# Patient Record
Sex: FEMALE | Race: WHITE | NOT HISPANIC OR LATINO | URBAN - METROPOLITAN AREA
[De-identification: names, ages, dates, MRNs, and addresses within clinical notes are randomized per-mention and may not be internally consistent; named-entity substitution may affect disease eponyms.]

---

## 2017-07-21 ENCOUNTER — ALLSCRIPTS OFFICE VISIT (OUTPATIENT)
Dept: OTHER | Facility: OTHER | Age: 67
End: 2017-07-21

## 2017-07-21 DIAGNOSIS — M25.472 EFFUSION OF LEFT ANKLE: ICD-10-CM

## 2017-07-21 DIAGNOSIS — I71.3 ABDOMINAL AORTIC ANEURYSM, RUPTURED (HCC): ICD-10-CM

## 2018-01-14 VITALS
HEIGHT: 66 IN | WEIGHT: 145.25 LBS | OXYGEN SATURATION: 98 % | RESPIRATION RATE: 16 BRPM | BODY MASS INDEX: 23.34 KG/M2 | DIASTOLIC BLOOD PRESSURE: 82 MMHG | SYSTOLIC BLOOD PRESSURE: 120 MMHG | HEART RATE: 79 BPM | TEMPERATURE: 98.5 F

## 2018-03-01 ENCOUNTER — ANESTHESIA EVENT (OUTPATIENT)
Dept: GASTROENTEROLOGY | Facility: HOSPITAL | Age: 68
End: 2018-03-01
Payer: COMMERCIAL

## 2018-03-02 ENCOUNTER — HOSPITAL ENCOUNTER (OUTPATIENT)
Facility: HOSPITAL | Age: 68
Setting detail: OUTPATIENT SURGERY
Discharge: HOME | End: 2018-03-02
Attending: COLON & RECTAL SURGERY | Admitting: COLON & RECTAL SURGERY
Payer: COMMERCIAL

## 2018-03-02 ENCOUNTER — ANESTHESIA (OUTPATIENT)
Dept: GASTROENTEROLOGY | Facility: HOSPITAL | Age: 68
End: 2018-03-02
Payer: COMMERCIAL

## 2018-03-02 VITALS
HEIGHT: 66 IN | TEMPERATURE: 97.9 F | RESPIRATION RATE: 18 BRPM | OXYGEN SATURATION: 100 % | SYSTOLIC BLOOD PRESSURE: 100 MMHG | HEART RATE: 69 BPM | BODY MASS INDEX: 21.05 KG/M2 | WEIGHT: 131 LBS | DIASTOLIC BLOOD PRESSURE: 58 MMHG

## 2018-03-02 DIAGNOSIS — Z86.010 HISTORY OF COLONIC POLYPS: ICD-10-CM

## 2018-03-02 PROCEDURE — 88305 TISSUE EXAM BY PATHOLOGIST: CPT | Performed by: PATHOLOGY

## 2018-03-02 PROCEDURE — 88305 TISSUE EXAM BY PATHOLOGIST: CPT | Performed by: COLON & RECTAL SURGERY

## 2018-03-02 RX ORDER — PROPOFOL 10 MG/ML
INJECTION, EMULSION INTRAVENOUS CONTINUOUS PRN
Status: DISCONTINUED | OUTPATIENT
Start: 2018-03-02 | End: 2018-03-02 | Stop reason: SURG

## 2018-03-02 RX ORDER — PROPOFOL 10 MG/ML
INJECTION, EMULSION INTRAVENOUS AS NEEDED
Status: DISCONTINUED | OUTPATIENT
Start: 2018-03-02 | End: 2018-03-02 | Stop reason: SURG

## 2018-03-02 RX ORDER — SODIUM CHLORIDE 9 MG/ML
125 INJECTION, SOLUTION INTRAVENOUS CONTINUOUS
Status: DISCONTINUED | OUTPATIENT
Start: 2018-03-02 | End: 2018-03-02 | Stop reason: HOSPADM

## 2018-03-02 RX ADMIN — SODIUM CHLORIDE 125 ML/HR: 0.9 INJECTION, SOLUTION INTRAVENOUS at 08:08

## 2018-03-02 RX ADMIN — PROPOFOL 120 MCG/KG/MIN: 10 INJECTION, EMULSION INTRAVENOUS at 08:47

## 2018-03-02 RX ADMIN — PROPOFOL 90 MG: 10 INJECTION, EMULSION INTRAVENOUS at 08:47

## 2018-03-02 NOTE — ANESTHESIA PREPROCEDURE EVALUATION
Review of Systems/Medical History  Patient summary reviewed  Chart reviewed  No history of anesthetic complications     Cardiovascular  Negative cardio ROS Exercise tolerance: good,     Pulmonary  Negative pulmonary ROS        GI/Hepatic  Negative GI/hepatic ROS   Bowel prep       Negative  ROS        Endo/Other  Negative endo/other ROS      GYN  Negative gynecology ROS          Hematology  Negative hematology ROS      Musculoskeletal  Negative musculoskeletal ROS        Neurology    Headaches,    Psychology   Negative psychology ROS              Physical Exam    Airway    Mallampati score: II  TM Distance: >3 FB  Neck ROM: full     Dental   No notable dental hx     Cardiovascular  Comment: Negative ROS, Cardiovascular exam normal    Pulmonary  Pulmonary exam normal     Other Findings        Anesthesia Plan  ASA Score- 2     Anesthesia Type- IV sedation with anesthesia with ASA Monitors  Additional Monitors:   Airway Plan:     Comment: Discussed with pt the possibility under sedation to have recall or mild discomfort        Plan Factors-    Induction- intravenous  Postoperative Plan-     Informed Consent- Anesthetic plan and risks discussed with patient  I personally reviewed this patient with the CRNA  Discussed and agreed on the Anesthesia Plan with the CRNA Gerhardt Sep

## 2018-03-02 NOTE — OP NOTE
**** GI/ENDOSCOPY REPORT ****     PATIENT NAME: Jamil Gage ------ VISIT ID:  Patient ID:   [id number] YOB: 1950     INTRODUCTION: Colonoscopy - A 74 female patient presents for an outpatient   Colonoscopy at [address] [place] [address]  PREVIOUS COLONOSCOPY: 2015     INDICATIONS: Screening for personal history of polyps  Screening for   family history of colorectal cancer, including the patient's grandmother  CONSENT:  The benefits, risks, and alternatives to the procedure were   discussed and informed consent was obtained from the patient  PREPARATION: EKG, pulse, pulse oximetry and blood pressure were monitored   throughout the procedure  The patient was identified by myself both   verbally and by visual inspection of ID band  MEDICATIONS: Anesthesia-check records     PROCEDURE:  The endoscope was passed without difficulty through the anus   under direct visualization and advanced to the cecum, confirmed by   appendiceal orifice and ileocecal valve  The scope was withdrawn and the   mucosa was carefully examined  The quality of the preparation was fairly   flushed to adequate  Cecal Intubation Time: 5 minutes(s) Scope Withdrawal   Time: 18 minutes(s)     RECTAL EXAM: Normal rectal exam      FINDINGS:  Splenic flexure polyp 5mm sessile, removed piecemeal cold   biopsy  COMPLICATIONS: There were no complications  IMPRESSIONS: Splenic flexure polyp 5mm sessile, removed piecemeal cold   biopsy  RECOMMENDATIONS: Colonoscopy recommended in 3 years  Start high fiber diet       ESTIMATED BLOOD LOSS:     PATHOLOGY SPECIMENS:     PROCEDURE CODES: Colonoscopy with biopsy     ICD-9 Codes: V12 74 Personal history of colonic polyps V16 0 Family   history of malignant neoplasm of gastrointestinal tract     ICD-10 Codes: Z86 010 Personal history of colonic polyps Z80 0 Family   history of malignant neoplasm of digestive organs     PERFORMED BY: Dr Jennifer Sultana [facility], M D  on 03/02/2018  Version 1, electronically signed by ARTURO Garcia  on   03/02/2018 at 09:15

## 2018-03-02 NOTE — ANESTHESIA POSTPROCEDURE EVALUATION
Post-Op Assessment Note      CV Status:  Stable    Mental Status:  Alert and lethargic    Hydration Status:  Euvolemic    PONV Controlled:  Controlled    Airway Patency:  Patent    Post Op Vitals Reviewed: Yes          Staff: CRNA           BP   100/74   Temp      Pulse   70   Resp   18   SpO2   100

## 2018-03-02 NOTE — H&P
History and Physical   Colon and Rectal Surgery   Asad Lei 74 y o  female MRN: [id number]  Unit/Bed#: [facility] Encounter: [id number]  03/02/18   8:40 AM      ASSESSMENT:Screening colonoscopy, risks including not limited to bleeding, missed lesion, perforation requiring emergent surgery discussed/understood  PLAN:Colonoscopy      History of Present Illness   HPI:  Asad Lei is a 74 y o  female who presents for colonoscopy, history of polyps, last 2015  Grandmother colon cancer  Denies nausea/vomiting/abdominal pain, rectal bleeding, or other constitutional symptoms, some alternating constipation/hard stools with watery stools        Historical Information   Past Medical History:   Diagnosis Date    Migraines      Past Surgical History:   Procedure Laterality Date    APPENDECTOMY      HYSTERECTOMY      TONSILLECTOMY         Meds/Allergies     Prescriptions Prior to Admission   Medication    Nutritional Supplements (VITAMIN D MAINTENANCE PO)         Current Facility-Administered Medications:     sodium chloride 0 9 % infusion, 125 mL/hr, Intravenous, Continuous, Jesus Grullon MD, Last Rate: 125 mL/hr at 03/02/18 0808, 125 mL/hr at 03/02/18 0808    Allergies   Allergen Reactions    Latex Rash         Social History   History   Alcohol Use    1 2 oz/week    2 Glasses of wine per week     Comment: saturday night      History   Drug Use No     History   Smoking Status    Never Smoker   Smokeless Tobacco    Never Used         Family History:   Grandmother colon cancer    Objective     Current Vitals:   Blood Pressure: 102/60 (03/02/18 0801)  Pulse: 63 (03/02/18 0801)  Temperature: 97 9 °F (36 6 °C) (03/02/18 0801)  Temp Source: Temporal (03/02/18 0801)  Respirations: 18 (03/02/18 0801)  Height: 5' 6" (167 6 cm) (03/02/18 0801)  Weight - Scale: 59 4 kg (131 lb) (03/02/18 0801)  SpO2: 99 % (03/02/18 0801)  No intake or output data in the 24 hours ending 03/02/18 0840    Physical Exam:  General:no distress  Eyes:perrla/eomi  ENT:moist mucus membranes  Neck:supple  Pulm:no increased work of breathing  CV:sinus  Abdomen:soft,nontender

## 2018-08-07 ENCOUNTER — ANESTHESIA EVENT (OUTPATIENT)
Dept: PERIOP | Facility: HOSPITAL | Age: 68
End: 2018-08-07
Payer: COMMERCIAL

## 2018-08-07 RX ORDER — MAG HYDROX/ALUMINUM HYD/SIMETH 400-400-40
1 SUSPENSION, ORAL (FINAL DOSE FORM) ORAL DAILY
COMMUNITY

## 2018-08-07 RX ORDER — MULTIVITAMIN
1 CAPSULE ORAL DAILY
COMMUNITY

## 2018-08-07 RX ORDER — FLUTICASONE PROPIONATE 50 MCG
1 SPRAY, SUSPENSION (ML) NASAL DAILY PRN
COMMUNITY

## 2018-08-07 NOTE — PRE-PROCEDURE INSTRUCTIONS
Pre-Surgery Instructions:   Medication Instructions    Cholecalciferol (VITAMIN D3) 5000 units CAPS Instructed patient per Anesthesia Guidelines   fluticasone (FLONASE) 50 mcg/act nasal spray Instructed patient per Anesthesia Guidelines   Multiple Vitamin (MULTIVITAMIN) capsule Instructed patient per Anesthesia Guidelines  Instructed to use flonase nasal spray am of surgery if needed per anesthesia

## 2018-08-08 ENCOUNTER — ANESTHESIA (OUTPATIENT)
Dept: PERIOP | Facility: HOSPITAL | Age: 68
End: 2018-08-08
Payer: COMMERCIAL

## 2018-08-08 ENCOUNTER — HOSPITAL ENCOUNTER (OUTPATIENT)
Facility: HOSPITAL | Age: 68
Setting detail: OUTPATIENT SURGERY
Discharge: HOME | End: 2018-08-09
Attending: OBSTETRICS & GYNECOLOGY | Admitting: OBSTETRICS & GYNECOLOGY
Payer: COMMERCIAL

## 2018-08-08 DIAGNOSIS — N81.10 FEMALE CYSTOCELE: Primary | ICD-10-CM

## 2018-08-08 PROCEDURE — C1771 REP DEV, URINARY, W/SLING: HCPCS | Performed by: OBSTETRICS & GYNECOLOGY

## 2018-08-08 PROCEDURE — C1781 MESH (IMPLANTABLE): HCPCS | Performed by: OBSTETRICS & GYNECOLOGY

## 2018-08-08 DEVICE — VAGINAL SUPPORT SYSTEM
Type: IMPLANTABLE DEVICE | Site: VAGINA | Status: FUNCTIONAL
Brand: UPHOLD™ LITE

## 2018-08-08 DEVICE — SLING TRNVG MID URETHRAL ADVANTAGE BLUE: Type: IMPLANTABLE DEVICE | Site: VAGINA | Status: FUNCTIONAL

## 2018-08-08 RX ORDER — SODIUM CHLORIDE 9 MG/ML
125 INJECTION, SOLUTION INTRAVENOUS CONTINUOUS
Status: DISCONTINUED | OUTPATIENT
Start: 2018-08-08 | End: 2018-08-09 | Stop reason: HOSPADM

## 2018-08-08 RX ORDER — ONDANSETRON 2 MG/ML
4 INJECTION INTRAMUSCULAR; INTRAVENOUS ONCE AS NEEDED
Status: DISCONTINUED | OUTPATIENT
Start: 2018-08-08 | End: 2018-08-08 | Stop reason: HOSPADM

## 2018-08-08 RX ORDER — PROPOFOL 10 MG/ML
INJECTION, EMULSION INTRAVENOUS CONTINUOUS PRN
Status: DISCONTINUED | OUTPATIENT
Start: 2018-08-08 | End: 2018-08-08 | Stop reason: SURG

## 2018-08-08 RX ORDER — LIDOCAINE HYDROCHLORIDE AND EPINEPHRINE 15; 5 MG/ML; UG/ML
INJECTION, SOLUTION EPIDURAL AS NEEDED
Status: DISCONTINUED | OUTPATIENT
Start: 2018-08-08 | End: 2018-08-08 | Stop reason: SURG

## 2018-08-08 RX ORDER — IBUPROFEN 600 MG/1
600 TABLET ORAL EVERY 6 HOURS
Status: DISCONTINUED | OUTPATIENT
Start: 2018-08-08 | End: 2018-08-09 | Stop reason: HOSPADM

## 2018-08-08 RX ORDER — LIDOCAINE HYDROCHLORIDE AND EPINEPHRINE 20; 5 MG/ML; UG/ML
INJECTION, SOLUTION EPIDURAL; INFILTRATION; INTRACAUDAL; PERINEURAL AS NEEDED
Status: DISCONTINUED | OUTPATIENT
Start: 2018-08-08 | End: 2018-08-08 | Stop reason: SURG

## 2018-08-08 RX ORDER — EPHEDRINE SULFATE 50 MG/ML
INJECTION, SOLUTION INTRAVENOUS AS NEEDED
Status: DISCONTINUED | OUTPATIENT
Start: 2018-08-08 | End: 2018-08-08 | Stop reason: SURG

## 2018-08-08 RX ORDER — FENTANYL CITRATE 50 UG/ML
INJECTION, SOLUTION INTRAMUSCULAR; INTRAVENOUS AS NEEDED
Status: DISCONTINUED | OUTPATIENT
Start: 2018-08-08 | End: 2018-08-08 | Stop reason: SURG

## 2018-08-08 RX ORDER — SODIUM CHLORIDE 9 MG/ML
75 INJECTION, SOLUTION INTRAVENOUS CONTINUOUS
Status: DISCONTINUED | OUTPATIENT
Start: 2018-08-08 | End: 2018-08-09 | Stop reason: HOSPADM

## 2018-08-08 RX ORDER — ONDANSETRON 2 MG/ML
4 INJECTION INTRAMUSCULAR; INTRAVENOUS EVERY 6 HOURS PRN
Status: DISCONTINUED | OUTPATIENT
Start: 2018-08-08 | End: 2018-08-09 | Stop reason: HOSPADM

## 2018-08-08 RX ORDER — MIDAZOLAM HYDROCHLORIDE 1 MG/ML
INJECTION INTRAMUSCULAR; INTRAVENOUS AS NEEDED
Status: DISCONTINUED | OUTPATIENT
Start: 2018-08-08 | End: 2018-08-08 | Stop reason: SURG

## 2018-08-08 RX ORDER — FENTANYL CITRATE/PF 50 MCG/ML
25 SYRINGE (ML) INJECTION
Status: DISCONTINUED | OUTPATIENT
Start: 2018-08-08 | End: 2018-08-08 | Stop reason: HOSPADM

## 2018-08-08 RX ORDER — PHENAZOPYRIDINE HYDROCHLORIDE 200 MG/1
200 TABLET, FILM COATED ORAL
Status: DISCONTINUED | OUTPATIENT
Start: 2018-08-08 | End: 2018-08-08 | Stop reason: HOSPADM

## 2018-08-08 RX ORDER — ONDANSETRON 2 MG/ML
INJECTION INTRAMUSCULAR; INTRAVENOUS AS NEEDED
Status: DISCONTINUED | OUTPATIENT
Start: 2018-08-08 | End: 2018-08-08 | Stop reason: SURG

## 2018-08-08 RX ORDER — ACETAMINOPHEN 325 MG/1
650 TABLET ORAL EVERY 6 HOURS
Status: DISCONTINUED | OUTPATIENT
Start: 2018-08-08 | End: 2018-08-09 | Stop reason: HOSPADM

## 2018-08-08 RX ORDER — DOCUSATE SODIUM 100 MG/1
100 CAPSULE, LIQUID FILLED ORAL 2 TIMES DAILY
Status: DISCONTINUED | OUTPATIENT
Start: 2018-08-08 | End: 2018-08-09 | Stop reason: HOSPADM

## 2018-08-08 RX ADMIN — ONDANSETRON HYDROCHLORIDE 4 MG: 2 INJECTION, SOLUTION INTRAVENOUS at 11:15

## 2018-08-08 RX ADMIN — SODIUM CHLORIDE 75 ML/HR: 0.9 INJECTION, SOLUTION INTRAVENOUS at 19:58

## 2018-08-08 RX ADMIN — DEXAMETHASONE SODIUM PHOSPHATE 8 MG: 10 INJECTION INTRAMUSCULAR; INTRAVENOUS at 09:25

## 2018-08-08 RX ADMIN — DOCUSATE SODIUM 100 MG: 100 CAPSULE, LIQUID FILLED ORAL at 18:20

## 2018-08-08 RX ADMIN — EPHEDRINE SULFATE 10 MG: 50 INJECTION, SOLUTION INTRAVENOUS at 10:03

## 2018-08-08 RX ADMIN — FENTANYL CITRATE 100 MCG: 50 INJECTION, SOLUTION INTRAMUSCULAR; INTRAVENOUS at 08:29

## 2018-08-08 RX ADMIN — SODIUM CHLORIDE: 0.9 INJECTION, SOLUTION INTRAVENOUS at 09:54

## 2018-08-08 RX ADMIN — ACETAMINOPHEN 650 MG: 325 TABLET, FILM COATED ORAL at 16:49

## 2018-08-08 RX ADMIN — IBUPROFEN 600 MG: 600 TABLET ORAL at 22:41

## 2018-08-08 RX ADMIN — LIDOCAINE HYDROCHLORIDE,EPINEPHRINE BITARTRATE 10 ML: 20; .005 INJECTION, SOLUTION EPIDURAL; INFILTRATION; INTRACAUDAL; PERINEURAL at 09:08

## 2018-08-08 RX ADMIN — SODIUM CHLORIDE 125 ML/HR: 0.9 INJECTION, SOLUTION INTRAVENOUS at 08:55

## 2018-08-08 RX ADMIN — IBUPROFEN 600 MG: 600 TABLET ORAL at 16:49

## 2018-08-08 RX ADMIN — PROPOFOL 60 MCG/KG/MIN: 10 INJECTION, EMULSION INTRAVENOUS at 09:20

## 2018-08-08 RX ADMIN — CEFAZOLIN SODIUM 1000 MG: 1 SOLUTION INTRAVENOUS at 18:21

## 2018-08-08 RX ADMIN — SODIUM CHLORIDE: 0.9 INJECTION, SOLUTION INTRAVENOUS at 11:23

## 2018-08-08 RX ADMIN — CEFAZOLIN SODIUM 2000 MG: 2 SOLUTION INTRAVENOUS at 09:10

## 2018-08-08 RX ADMIN — PHENAZOPYRIDINE HYDROCHLORIDE 200 MG: 200 TABLET ORAL at 07:01

## 2018-08-08 RX ADMIN — MIDAZOLAM 2 MG: 1 INJECTION INTRAMUSCULAR; INTRAVENOUS at 08:29

## 2018-08-08 RX ADMIN — ACETAMINOPHEN 650 MG: 325 TABLET, FILM COATED ORAL at 22:41

## 2018-08-08 RX ADMIN — MIDAZOLAM 2 MG: 1 INJECTION INTRAMUSCULAR; INTRAVENOUS at 09:10

## 2018-08-08 RX ADMIN — SODIUM CHLORIDE 125 ML/HR: 0.9 INJECTION, SOLUTION INTRAVENOUS at 07:11

## 2018-08-08 RX ADMIN — LIDOCAINE HYDROCHLORIDE AND EPINEPHRINE 3 ML: 15; 5 INJECTION, SOLUTION EPIDURAL at 09:05

## 2018-08-08 NOTE — ANESTHESIA PREPROCEDURE EVALUATION
Review of Systems/Medical History  Patient summary reviewed  Chart reviewed  History of anesthetic complications PONV    Cardiovascular  Negative cardio ROS    Pulmonary  Negative pulmonary ROS        GI/Hepatic  Negative GI/hepatic ROS          Negative  ROS        Endo/Other  Negative endo/other ROS      GYN  Negative gynecology ROS          Hematology  Negative hematology ROS      Musculoskeletal  Back pain (HLD) , lumbar pain,   Arthritis     Neurology    Headaches, Visual impairment (B cataracts),    Psychology   Negative psychology ROS              Physical Exam    Airway    Mallampati score: II  TM Distance: >3 FB  Neck ROM: full     Dental   Comment: FEW MOLAR CROWNS,     Cardiovascular  Comment: Negative ROS, Rhythm: regular, Rate: normal, Cardiovascular exam normal    Pulmonary  Pulmonary exam normal Breath sounds clear to auscultation,     Other Findings        Anesthesia Plan  ASA Score- 2     Anesthesia Type- epidural with ASA Monitors  Additional Monitors:   Airway Plan:         Plan Factors-Patient not instructed to abstain from smoking on day of procedure  Patient did not smoke on day of surgery  Induction- intravenous  Postoperative Plan-     Informed Consent- Anesthetic plan and risks discussed with patient and son

## 2018-08-08 NOTE — DISCHARGE INSTRUCTIONS
Anterior Vaginal Repair   WHAT YOU NEED TO KNOW:   An anterior vaginal repair is a procedure to lift or tighten the front vaginal wall  This can help prevent you from leaking urine  The procedure is also called an anterior colporrhaphy  DISCHARGE INSTRUCTIONS:   Medicines:   · Pain medicine: You may need medicine to take away or decrease pain  ¨ Learn how to take your medicine  Ask what medicine and how much you should take  Be sure you know how, when, and how often to take it  ¨ Do not wait until the pain is severe before you take your medicine  Tell caregivers if your pain does not decrease  ¨ Pain medicine can make you dizzy or sleepy  Prevent falls by calling someone when you get out of bed or if you need help  · Antibiotics: This medicine is given to fight or prevent an infection caused by bacteria  Always take your antibiotics exactly as ordered by your healthcare provider  Do not stop taking your medicine unless directed by your healthcare provider  Never save antibiotics or take leftover antibiotics that were given to you for another illness  · Take your medicine as directed  Contact your healthcare provider if you think your medicine is not helping or if you have side effects  Tell him or her if you are allergic to any medicine  Keep a list of the medicines, vitamins, and herbs you take  Include the amounts, and when and why you take them  Bring the list or the pill bottles to follow-up visits  Carry your medicine list with you in case of an emergency  Follow up with your healthcare provider as directed:  Write down your questions so you remember to ask them during your visits  Self-care:   · Sex:  Do not have sex until your healthcare provider says it is okay  · Kegel exercises: To do kegel exercises, squeeze your pelvic floor muscles for 5 to 10 seconds, then release  Regular kegel exercises will help your pelvic floor muscles become stronger   This will help prevent you from leaking urine  Ask your healthcare provider when to start these exercises and how often to do them  · Sanitary pad:  Change your sanitary pad regularly  Keep track of how often you change the pad  · Love catheter:  Keep the bag below your waist  This will help prevent infection and other problems caused by urine flowing back into your bladder  Do not pull on the catheter because this can cause pain and bleeding, and the catheter could come out  Keep the catheter tubing free of kinks so your urine will flow into the bag  Your healthcare provider will remove the catheter as soon as possible, to help prevent infection  · Wound care:  When you are allowed to bathe or shower, carefully wash your vaginal area with soap and water  · Do not put pressure on your abdomen: This will help prevent damage to your surgery area  Do not strain, lift heavy objects, or stand for a very long time  Do not perform strenuous exercises, such as running and weight lifting  · Activity:  You may need to start walking within a few days after your procedure  Ask your healthcare provider when to start and how long you should walk  Ask about any other exercises that may be right for you  · Support socks: You may need to wear support socks  These are tight socks that help increase the circulation in your legs until you are more active  This helps prevent blood clots  Contact your healthcare provider if:   · You soak a sanitary pad with blood every hour for 4 hours  · You have vaginal pain that does not go away even after you take pain medicine  · You have pus or a foul-smelling discharge from your genital area  · You see blood in your urine  · You have pain during sex  · You have a fever, chills, a cough, or feel weak and achy  · You have nausea and vomiting  · You have questions or concerns about your condition or care    Seek care immediately or call 911 if:   · You feel something is bulging out into your vagina or rectum and not going back in     · You cannot urinate  · Your arm or leg feels warm, tender, and painful  It may look swollen and red  · You suddenly feel lightheaded and short of breath  · You have chest pain  You may have more pain when you take a deep breath or cough  You may cough up blood  © 2017 [facility] Information is for End User's use only and may not be sold, redistributed or otherwise used for commercial purposes  All illustrations and images included in CareNotes® are the copyrighted property of A [employer] M [employer] Inc  or [employer]  The above information is an  only  It is not intended as medical advice for individual conditions or treatments  Talk to your doctor, nurse or pharmacist before following any medical regimen to see if it is safe and effective for you  Bladder Sling Procedure   WHAT YOU NEED TO KNOW:   A bladder sling procedure is surgery to treat urinary incontinence in women  The sling acts as a hammock to keep your urethra in place and hold it closed when your bladder is full  You may have vaginal bleeding or discharge for up to a week after your surgery  Use sanitary pads  Do not use tampons  You may have some pelvic discomfort or trouble urinating  DISCHARGE INSTRUCTIONS:   Call 911 for any of the following:   · You have sudden trouble breathing  Seek care immediately if:   · Your bleeding gets worse  · You have yellow or foul smelling discharge from your vagina  · You cannot urinate, or you are urinating less than what is normal for you  · You feel confused  Contact your healthcare provider if:   · You have a fever  · You do not feel like you are able to empty your bladder completely when you urinate  · You feel the need to urinate very suddenly  · You have burning or stinging when you urinate  · You have blood in your urine  · Your skin is itchy, swollen, or you have a rash      · You have questions or concerns about your condition or care  Medicines:   · Prescription pain medicine  may be given  Ask your how to take this medicine safely  · Take your medicine as directed  Contact your healthcare provider if you think your medicine is not helping or if you have side effects  Tell him or her if you are allergic to any medicine  Keep a list of the medicines, vitamins, and herbs you take  Include the amounts, and when and why you take them  Bring the list or the pill bottles to follow-up visits  Carry your medicine list with you in case of an emergency  Self-catheterization:  You may need to put a catheter into your bladder after you urinate to empty any remaining urine  A catheter is a small rubber tube used to drain urine  Healthcare providers will teach you how to put the catheter in safely  This may be needed until you are completely emptying your bladder  Love catheter: You may have a Love catheter for a short period of time  The Love is a tube put into your bladder to drain urine into a bag  Keep the bag below your waist  This will prevent urine from flowing back into your bladder and causing an infection or other problems  Also, keep the tube free of kinks so the urine will drain properly  Do not pull on the catheter  This can cause pain and bleeding, and may cause the catheter to come out  Activity:  Do not lift heavy objects for 6 weeks after your procedure  Do not have intercourse for 4 to 6 weeks  Do not use a tampon for 4 weeks  Ask your healthcare provider when you can return to work or your usual activities  Do pelvic muscle exercises: These are also called Kegel exercises  These exercises help strengthen your pelvic muscles and help prevent urine leakage  Tighten the muscles of your pelvis and hold them tight for 5 seconds, then relax for 5 seconds  Gradually work up to tightening them for 10 seconds and relaxing for 10 seconds  Do this 3 times each day    Keep a record: Keep a record of when you urinate and if you leak any urine  Write down what you were doing when you leaked urine, such as coughing or sneezing  Bring the log to your follow-up visits  Prevent constipation:  Drink liquids as directed  You may need to drink more water than usual to soften your bowel movements  Eat a variety of healthy foods, especially fruit and foods high in fiber  You may need to use an over-the-counter bowel movement softener  Follow up with your healthcare provider as directed: You may need a test to check how much urine remains in your bladder after you urinate  This will help show how the sling is working  Write down your questions so you remember to ask them during your visits  © 2017 [facility] Information is for End User's use only and may not be sold, redistributed or otherwise used for commercial purposes  All illustrations and images included in CareNotes® are the copyrighted property of A [employer] A M , Inc  or [employer]  The above information is an  only  It is not intended as medical advice for individual conditions or treatments  Talk to your doctor, nurse or pharmacist before following any medical regimen to see if it is safe and effective for you

## 2018-08-08 NOTE — ANESTHESIA PROCEDURE NOTES
Epidural Block    Patient location during procedure: holding area  Start time: 8/8/2018 8:29 AM  Reason for block: at surgeon's request and primary anesthetic  Staffing  Anesthesiologist: Cody Layton Checklist  Completed: patient identified, site marked, surgical consent, pre-op evaluation, timeout performed, IV checked, risks and benefits discussed and monitors and equipment checked  Epidural  Patient position: sitting  Prep: Betadine  Patient monitoring: heart rate, continuous pulse ox and frequent blood pressure checks  Approach: midline  Location: lumbar (1-5)  Needle  Needle type: Tuohy   Needle gauge: 18 G  Catheter type: side hole  Catheter size: 20 G  Catheter at skin depth: 4 cm  Test dose: negative and lidocaine 1 5% with epinephrine 1-to-200,000negative aspiration for CSF, negative aspiration for heme and no paresthesia on injection  patient tolerated the procedure well with no immediate complications  Additional Notes  EPIDURAL CATHETER REMOVED WITH TIP INTTACT

## 2018-08-08 NOTE — PROGRESS NOTES
Progress Note - OB/GYN  Post-Op Check  Dorise Grief 74 y o  female MRN: [id number]  Unit/Bed#: E2 -01 Encounter: [id number]      A/P:  Post-Op day # 0 status post VEC, anterior colporrhaphy, pubovaginal sling insertion, cystoscopy  1) Routine Post-op Care: continue  2) Diet: Advance as tolerated  3) Boggs: Will d/c boggs tomorrow morning  4) UOP: 900 cc charted over 8 hrs; color: yellow   5) Labs: f/u CBC in am  6) DVT ppx: Cont   SCDs  7) Pain control: PO pain medications  8) Monitor vital signs    SUBJECTIVE:  Pain: minimal  Tolerating Oral Intake: is tolerating PO liquids and solids  Voiding: boggs inserted  Flatus: yes  Bowel Movement: no  Ambulating: No (boggs still inserted)  Chest Pain: no  Shortness of Breath: no  Leg Pain/Discomfort: no    OBJECTIVE:     Vitals:   Vitals:    08/08/18 1317 08/08/18 1323 08/08/18 1400 08/08/18 1510   BP:  137/86 126/74 132/85   BP Location:  Right arm Right arm Right arm   Pulse:  62 66 75   Resp:  16 18 18   Temp:  98 °F (36 7 °C) 98 °F (36 7 °C) (!) 96 7 °F (35 9 °C)   TempSrc:    Tympanic   SpO2: 96% 94% 96% 96%   Weight:       Height:           I/O       08/06 0701 - 08/07 0700 08/07 0701 - 08/08 0700 08/08 0701 - 08/09 0700    I V  (mL/kg)   3000 (49 8)    Total Intake(mL/kg)   3000 (49 8)    Urine (mL/kg/hr)   1770 (2 7)    Total Output     1770    Net     +1230                                   Invalid input(s): CA        Invalid input(s): DBILI, ALP    Physical exam:  General: sitting comfortably, AAOx3  Heart: RRR  Lungs: CTAB  Abdomen: soft, nondistended, nontender  Extremities: Nontender, SCDs currently on     MEDS:   Current Facility-Administered Medications   Medication Dose Route Frequency    acetaminophen (TYLENOL) tablet 650 mg  650 mg Oral Q6H    ceFAZolin (ANCEF) IVPB (premix) 1,000 mg  1,000 mg Intravenous Q8H    docusate sodium (COLACE) capsule 100 mg  100 mg Oral BID    ibuprofen (MOTRIN) tablet 600 mg  600 mg Oral Q6H    ondansetron [facility]) injection 4 mg  4 mg Intravenous Q6H PRN    sodium chloride 0 9 % infusion  125 mL/hr Intravenous Continuous    sodium chloride 0 9 % infusion  75 mL/hr Intravenous Continuous     Invasive Devices     Peripheral Intravenous Line            Peripheral IV 08/08/18 Left Forearm less than 1 day          Drain            Urethral Catheter Non-latex 18 Fr  less than 1 day              Medication Administration - last 24 hours from 08/07/2018 1742 to 08/08/2018 1742       Date/Time Order Dose Route Action Action by     08/08/2018 1203 sodium chloride 0 9 % infusion 125 mL/hr Intravenous Continued from Bethany Flores 71, RN     08/08/2018 1123 sodium chloride 0 9 % infusion   Intravenous New [facility], CRNA     08/08/2018 0954 sodium chloride 0 9 % infusion   Intravenous New 130[facility], CRNA     08/08/2018 2780 sodium chloride 0 9 % infusion   Intravenous Anesthesia Volume Adjustment Andie Villeda, CRNA     08/08/2018 0855 sodium chloride 0 9 % infusion 125 mL/hr Intravenous New Bag Mace Meigs, RN     08/08/2018 3670 sodium chloride 0 9 % infusion 125 mL/hr Intravenous New Bag Les Salgado RN     08/08/2018 1019 bacitracin 50,000 Units, gentamicin (GARAMYCIN) 40 mg/mL 80 mg in sodium chloride 0 9 % 1,000 mL irrigation bag 1,000 mL Irrigation Given Pam Macdonald MD     08/08/2018 0701 phenazopyridine (PYRIDIUM) tablet 200 mg 200 mg Oral Given Les Salgado RN     08/08/2018 1649 ibuprofen (MOTRIN) tablet 600 mg 600 mg Oral Given Jeramie Arias RN     08/08/2018 1649 acetaminophen (TYLENOL) tablet 650 mg 650 mg Oral Given Jeramie Arias RN            Signature / Title:  Bogdan Hassan MD, MD, Resident - Ob/Gyn    Date: 8/8/2018  Time: 5:42 PM

## 2018-08-08 NOTE — PLAN OF CARE
Problem: Nutrition/Hydration-ADULT  Goal: Nutrient/Hydration intake appropriate for improving, restoring or maintaining nutritional needs  Monitor and assess patient's nutrition/hydration status for malnutrition (ex- brittle hair, bruises, dry skin, pale skin and conjunctiva, muscle wasting, smooth red tongue, and disorientation)  Collaborate with interdisciplinary team and initiate plan and interventions as ordered  Monitor patient's weight and dietary intake as ordered or per policy  Utilize nutrition screening tool and intervene per policy  Determine patient's food preferences and provide high-protein, high-caloric foods as appropriate       INTERVENTIONS:  - Monitor oral intake, urinary output, labs, and treatment plans  - Assess nutrition and hydration status and recommend course of action  - Evaluate amount of meals eaten  - Assist patient with eating if necessary   - Allow adequate time for meals  - Recommend/ encourage appropriate diets, oral nutritional supplements, and vitamin/mineral supplements  - Order, calculate, and assess calorie counts as needed    - Assess need for intravenous fluids  - Provide specific nutrition/hydration education as appropriate  - Include patient/family/caregiver in decisions related to nutrition  Outcome: Progressing

## 2018-08-08 NOTE — OP NOTE
OPERATIVE REPORT  PATIENT NAME: Diallo Paul    :  1950  MRN: [id number]  Pt Location: [facility] 08    SURGERY DATE: 2018    Surgeon(s) and Role:     * Rosa M Mac MD - Primary     * Charli Walker MD - Resident, Malcom Saravia MD - Fellow, Assist    Preop Diagnosis:  Incomplete uterovaginal prolapse [N81 2]  Cystocele, midline [N81 11]  Incompetence or weakening of pubocervical tissue [N81 82]  Stress incontinence [N39 3]  Hypermobility of urethra [N36 41]    Post-Op Diagnosis Codes:     * Incomplete uterovaginal prolapse [N81 2]     * Cystocele, midline [N81 11]     * Incompetence or weakening of pubocervical tissue [N81 82]     * Stress incontinence [N39 3]     * Hypermobility of urethra [N36 41]    Procedure(s) (LRB):  COLPOPEXY VAGINAL EXTRAPERITONEAL (VEC) (N/A)  ANTERIOR, COLPORRHAPHY (N/A)  INSERTION PUBOVAGINAL SLING (FEMALE) (N/A)  CYSTOSCOPY (N/A)    Specimen(s):  * No specimens in log *    Estimated Blood Loss:   Minimal    Drains:  Urethral Catheter Non-latex 18 Fr  (Active)   Number of days: 0       Anesthesia Type:   Epidural    Operative Indications:  Incomplete uterovaginal prolapse [N81 2]  Cystocele, midline [N81 11]  Incompetence or weakening of pubocervical tissue [N81 82]  Stress incontinence [N39 3]  Hypermobility of urethra [N36 41]    Operative Findings:  1  Stage 3 cystocele and apical prolapse  2  No significant posterior defect  3  No occult bladder or urethral injury on cystoscopy  Robust bilateral efflux from ureteral orifices at completion of the case  Complications:   None    Procedure and Technique:    Appropriate preoperative antibiotics chosen per ACOG guidelines were given  Bilateral SCDs were placed in the lower extremities for DVT prevention prior to the institution of anesthesia  No bladder, ureteral, viscus, or solid organ injury were noted at the end of the procedure      The patient was identified in the holding area by the operating room staff and attending physician  She was taken to the operating room where anesthesia was instituted without complications  She was placed in the dorsal lithotomy position with the legs in [address] [place] [address] with care taken to avoid excessive flexion or extension of her lower extremities  The patient was prepped and draped in the usual sterile fashion  A Love catheter was inserted  The patient was taken to the operating room where she was properly identified  She was given spinal anesthesia and placed in the dorsal lithotomy position in [place] stirrups with care taken to avoid excess flexion or extension at the hips and knees  She was prepped and draped in a normal sterile fashion  A Love catheter was inserted in the bladder  The vesicovaginal space was infiltrated with a dilute Marcaine solution with epinephrine  A full-thickness anterior vaginal wall incision was then made vertically in the midline extending the length of the cystocele  Sharp dissection was then carried out bilaterally to further open the vesicovaginal space  Blunt dissection was then used to break into the paravaginal and paravesical spaces where the ischial spines and sacrospinous ligaments could be identified and cleared off bluntly bilaterally  The prevesical tissue was plicated across the midline with approximately 3 interrupted sutures of 2-0 Vicryl  The Uphold system was then opened  The right arm of the system was articulated with the Capio Device  The head of the Capio was placed in the right paravaginal space and placed over the sacrospinous ligament 2 cm medial to the ischial spine where it was fired capturing the ligament  This procedure was then repeated on the patient's left side  Initial adjustments were made to the arms of the system   The proximal port portion of the body of the mesh in the midline was anchored to the anterior stroma of the cervix at the 12 o'clock position with an interrupted figure-of-eight suture of 0 Ethibond  Final adjustments were then made to the arms of the system, elevating the apex of the vagina and the cervix up to the level of the sacrospinous ligaments  The Prolene suture loop on the patient's right side was cut, and the suture loop and the plastic sheath covering the mesh arm were removed leaving just the right mesh arm in the right sacrospinous ligament  This procedure was then repeated on the patient's left side  The distal portion of the body and the mesh in the midline was anchored just behind the vaginal wall at the level of the bladder neck with an interrupted suture of 2-0 PDS  An additional tacking stitch of 2-0 Vicryl was placed on either side the midline so that the body of the mesh lay flat in the vesicovaginal space  This space was then irrigated with an antibiotic saline solution, and good hemostasis was noted  Excess vaginal wall was trimmed from each edge of the incision  The remaining pubocervical fascia was then plicated across the midline as the full-thickness anterior vaginal wall incision was closed with a running locked suture of 2-0 Vicryl for excellent hemostasis  Next, the retropubic sling procedure was performed  Two areas on the anterior abdominal wall at the level of pubic bone 2 fingerbreadths above and lateral to the midline on the pubic bone, were identified and two skin marks were placed  Local anesthetic solution was delivered at these sites infiltrating the muscle, fascia, and subdermal levels  We then turned our attention to the anterior vaginal wall  The mid urethral level was identified by reference to the Love bulb at the external meatus  Local anesthetic solution was injected into the anterior vaginal wall at the midurethral level for hydrodissection and vasoconstriction   Then, 10 mL were injected at the midline and an additional 7 mL were injected to the left and right of midline directing the infiltration laterally towards the ipsilateral mid-axillary line under the pubic symphysis  After completion of hydrodissection, 2 Allis clamps were used to grasp the anterior vaginal wall for traction, and a 1 3-cm incision was made at the midline incising epithelium and muscularis layers  Two Allis clamps were then placed on each cut edge of the incision for stabilization  Tenotomy scissors were used to create a small vaginal tunnel with sharp and blunt dissection in the anterior vaginal wall directing the dissection lateral to the midline, going towards the underside of the pubic bone  Dissection was carried out to the edge of the pubic bone itself, and we did this bilaterally  Once the dissection was complete, the Love was drained  The rigid stylette was placed through the Love  This will be used for deflection of the bladder, and urethra out of the anticipated path of the trocar  We deviated the bladder to the left side of the patient, and we passed an Advantage sling via the [address] introducer into the pre-dissected tract on the right side of the patient  Once we pierced the pubocervical fascia and passed the bone, we directed our trajectory of the trocar ventral towards the anterior abdominal wall towards the pre-marked skin on the right side the patient maintaining contact with the pubic symphysis  Once we were at the level of the skin, we incised the skin at the misty with a scalpel, and the trocar was passed through that incision  At this time, this was held with ring forceps leaving the trocar in place  We drained the bladder and removed the Love and we performed a cystoscopy by instilling 200 mL of fluid inside the bladder  There was no evidence of any punctures, lacerations, or injuries to the bladder  We reinserted the Love catheter to again drain the bladder   We removed the introducer on the right side and pulled the blue plastic cover of the sling with a Ally clamp until the mesh came out the abdominal incision and secured it at the level of the mesh with the [employer]  We cut the blue plastic cover above the Ally clamp  We repeated the same sequence of steps for the placement of the left side  Once this was done bilaterally, cystoscopy was performed again  Good efflux was noted bilaterally from the ureteral orifices  We retrograde filled the bladder with 300 mL and removed the cystoscope  We performed the cough stress or Crede maneuver and adjusted the sling until we saw minimal to no leakage of fluid  Once the sling was tensioned properly, we removed the plastic sleeve surrounding the sling arms by gently pulling them up through the skin incisions bilaterally with a Ally clamp as counter traction was placed at the midurethral level to avoid any further tightening  The sling arms were trimmed to the level of the skin  The incision in the vaginal mucosa was closed with 2-0 Vicryl suture in a running locked stitch  Good hemostasis was achieved  The skin incisions were closed with Histoacryl  The patient tolerated the procedure well  The Love catheter was reinserted  Vaginal packing was placed in the vagina  The sponge, needle and instrument count were correct x 2  The patient tolerated the procedure well  She was awakened from anesthesia and transferred to the recovery room in stable condition  A qualified resident physician was not available  Dr Tian Cornejo was present for the entire procedure  No qualified resident available to assist with procedure      Patient Disposition:  PACU     SIGNATURE: Jacobo Pradhan MD  DATE: August 8, 2018  TIME: 11:15 AM

## 2018-08-09 VITALS
OXYGEN SATURATION: 100 % | RESPIRATION RATE: 18 BRPM | BODY MASS INDEX: 20.88 KG/M2 | WEIGHT: 133 LBS | HEART RATE: 76 BPM | TEMPERATURE: 97.9 F | SYSTOLIC BLOOD PRESSURE: 97 MMHG | DIASTOLIC BLOOD PRESSURE: 70 MMHG | HEIGHT: 67 IN

## 2018-08-09 PROCEDURE — 90670 PCV13 VACCINE IM: CPT | Performed by: OBSTETRICS & GYNECOLOGY

## 2018-08-09 RX ORDER — ACETAMINOPHEN 500 MG
500 TABLET ORAL EVERY 6 HOURS PRN
Qty: 30 TABLET | Refills: 0
Start: 2018-08-09

## 2018-08-09 RX ORDER — IBUPROFEN 600 MG/1
600 TABLET ORAL EVERY 6 HOURS PRN
Qty: 30 TABLET | Refills: 0
Start: 2018-08-09

## 2018-08-09 RX ADMIN — CEFAZOLIN SODIUM 1000 MG: 1 SOLUTION INTRAVENOUS at 09:41

## 2018-08-09 RX ADMIN — PNEUMOCOCCAL 13-VALENT CONJUGATE VACCINE 0.5 ML: 2.2; 2.2; 2.2; 2.2; 2.2; 4.4; 2.2; 2.2; 2.2; 2.2; 2.2; 2.2; 2.2 INJECTION, SUSPENSION INTRAMUSCULAR at 12:34

## 2018-08-09 RX ADMIN — ACETAMINOPHEN 650 MG: 325 TABLET, FILM COATED ORAL at 12:36

## 2018-08-09 RX ADMIN — IBUPROFEN 600 MG: 600 TABLET ORAL at 06:00

## 2018-08-09 RX ADMIN — IBUPROFEN 600 MG: 600 TABLET ORAL at 12:37

## 2018-08-09 RX ADMIN — CEFAZOLIN SODIUM 1000 MG: 1 SOLUTION INTRAVENOUS at 02:19

## 2018-08-09 RX ADMIN — DOCUSATE SODIUM 100 MG: 100 CAPSULE, LIQUID FILLED ORAL at 09:41

## 2018-08-09 NOTE — PROGRESS NOTES
[facility] Goins Progress note   Samuel Ayala 74 y o  female MRN: [id number]  Unit/Bed#: E2 -01 Encounter: [id number]      A/P: 74 y o  female s/p anterior VEC, anterior colporrhaphy, pubovaginal sling & cystoscopy for uterovaginal prolapse, cystocele and stress urinary incontinence, POD# 1, doing well postoperatively  1) Uterovaginal prolapse, cystocele, stress urinary incontinence: s/p surgery as mentioned above  2) Post operative Care:                        - FEN: Regular diet                        - Pain: Scheduled Tylenol & Motrin                        - DVT ppx: SCDs                        - Encouraged incentive spirometry to reduce atelectasis and pneumonia risk                        - Encouraged ambulation as tolerated                        - Boggs catheter to be removed this morning, follow up voiding trial in 4 hours                        - Vaginal packing removed this morning  3) Disposition: Anticipate discharge today pending clinical status; outpatient follow-up with Dr Dwayne Ocasio in 2 weeks  Samuel Ayala has no current complaints  She reports that she rested well overnight  Pain is well controlled with PO medication  Boggs is in place draining urine, nurse in room to remove boggs  She is not yet ambulating  Patient is currently passing flatus and has had no bowel movement  She is tolerating PO, and denies nausea or vomitting  Patient denies fever, chills, chest pain, shortness of breath, or calf tenderness  /73   Pulse 67   Temp 97 6 °F (36 4 °C) (Temporal)   Resp 20   Ht 5' 7" (1 702 m)   Wt 60 3 kg (133 lb)   SpO2 95%   Breastfeeding? No   BMI 20 83 kg/m²     I/O last 3 completed shifts:   In: 3000 [I V :3000]  Out: 2620 [Urine:2620]  I/O this shift:  In: -   Out: 850 [Urine:850]    Lab Results   Component Value Date    WBC 3 49 (L) 12/02/2015    HGB 13 8 12/02/2015    HCT 42 4 12/02/2015    MCV 87 12/02/2015     12/02/2015       Lab Results   Component Value Date GLUCOSE 87 12/02/2015    CALCIUM 8 9 12/02/2015     12/02/2015    K 4 4 12/02/2015    CO2 27 12/02/2015     12/02/2015    BUN 16 12/02/2015    CREATININE 0 86 12/02/2015       No results found for: POCGLU    Physical Exam  Gen: NAD, AAOx3, Pleasant & Cooperative  CV: RRR, No M/R/G  Resp: CTAB, No W/R/R  Abdomen: Soft, non-distended, non-tender  Mild ecchymoses at the level of pubovaginal sling incisions, appropriately tender, no rebound/guarding  : Vaginal packing removed during examination without difficulty, external genitalia normal appearing  Ext: Symmetric, non-tender    Ysabel MD Lara  OBGYN PGY3  8/9/2018  5:59 AM

## 2018-09-26 NOTE — OP NOTE
Ken Ramirez MD Physician Signed Urogynecology Op Note Date of Service: 2018  9:27 AM   Case ID: [id number] Case Time: 2018  9:27 AM Surgeon: Domonique Gunter MD   Procedure: COLPOPEXY VAGINAL EXTRAPERITONEAL (VEC) Location: [facility]          []Hide copied text  []Hover for attribution information  OPERATIVE REPORT  PATIENT NAME: Bibiana Szymanski    :  1950  MRN: [id number]  Pt Location: [facility] 08     SURGERY DATE: 2018     Surgeon(s) and Role:     * Domonique Gunter MD - Primary     * Edgar Chandra MD - Resident, Renard Wray MD - Fellow, Assist     Preop Diagnosis:  Incomplete uterovaginal prolapse [N81 2]  Cystocele, midline [N81 11]  Incompetence or weakening of pubocervical tissue [N81 82]  Stress incontinence [N39 3]  Hypermobility of urethra [N36 41]     Post-Op Diagnosis Codes:     * Incomplete uterovaginal prolapse [N81 2]     * Cystocele, midline [N81 11]     * Incompetence or weakening of pubocervical tissue [N81 82]     * Stress incontinence [N39 3]     * Hypermobility of urethra [N36 41]     Procedure(s) (LRB):  COLPOPEXY VAGINAL EXTRAPERITONEAL (VEC) (N/A)  ANTERIOR, COLPORRHAPHY (N/A)  INSERTION PUBOVAGINAL SLING (FEMALE) (N/A)  CYSTOSCOPY (N/A)     Specimen(s):  * No specimens in log *     Estimated Blood Loss:   Minimal     Drains:  Urethral Catheter Non-latex 18 Fr  (Active)   Number of days: 0         Anesthesia Type:   Epidural     Operative Indications:  Incomplete uterovaginal prolapse [N81 2]  Cystocele, midline [N81 11]  Incompetence or weakening of pubocervical tissue [N81 82]  Stress incontinence [N39 3]  Hypermobility of urethra [N36 41]     Operative Findings:  1  Stage 3 cystocele and apical prolapse  2  No significant posterior defect  3  No occult bladder or urethral injury on cystoscopy   Robust bilateral efflux from ureteral orifices at completion of the case       Complications:   None     Procedure and Technique:     Appropriate preoperative antibiotics chosen per ACOG guidelines were given  Bilateral SCDs were placed in the lower extremities for DVT prevention prior to the institution of anesthesia      No bladder, ureteral, viscus, or solid organ injury were noted at the end of the procedure      The patient was identified in the holding area by the operating room staff and attending physician  She was taken to the operating room where anesthesia was instituted without complications  She was placed in the dorsal lithotomy position with the legs in [address] [place] [address] with care taken to avoid excessive flexion or extension of her lower extremities  The patient was prepped and draped in the usual sterile fashion  A Love catheter was inserted      The patient was taken to the operating room where she was properly identified  She was given spinal anesthesia and placed in the dorsal lithotomy [facility] [facility] with care taken to avoid excess flexion or extension at the hips and knees  She was prepped and draped in a normal sterile fashion  A Love catheter was inserted in the bladder  The vesicovaginal space was infiltrated with a dilute Marcaine solution with epinephrine  A full-thickness anterior vaginal wall incision was then made vertically in the midline extending the length of the cystocele  Sharp dissection was then carried out bilaterally to further open the vesicovaginal space  Blunt dissection was then used to break into the paravaginal and paravesical spaces where the ischial spines and sacrospinous ligaments could be identified and cleared off bluntly bilaterally  The prevesical tissue was plicated across the midline with approximately 3 interrupted sutures of 2-0 Vicryl  The Uphold system was then opened  The right arm of the system was articulated with the Capio Device   The head of the Capio was placed in the right paravaginal space and placed over the sacrospinous ligament 2 cm medial to the ischial spine where it was fired capturing the ligament  This procedure was then repeated on the patient's left side  Initial adjustments were made to the arms of the system  The proximal port portion of the body of the mesh in the midline was anchored to the anterior stroma of the cervix at the 12 o'clock position with an interrupted figure-of-eight suture of 0 Ethibond  Final adjustments were then made to the arms of the system, elevating the apex of the vagina and the cervix up to the level of the sacrospinous ligaments  The Prolene suture loop on the patient's right side was cut, and the suture loop and the plastic sheath covering the mesh arm were removed leaving just the right mesh arm in the right sacrospinous ligament  This procedure was then repeated on the patient's left side  The distal portion of the body and the mesh in the midline was anchored just behind the vaginal wall at the level of the bladder neck with an interrupted suture of 2-0 PDS  An additional tacking stitch of 2-0 Vicryl was placed on either side the midline so that the body of the mesh lay flat in the vesicovaginal space  This space was then irrigated with an antibiotic saline solution, and good hemostasis was noted  Excess vaginal wall was trimmed from each edge of the incision  The remaining pubocervical fascia was then plicated across the midline as the full-thickness anterior vaginal wall incision was closed with a running locked suture of 2-0 Vicryl for excellent hemostasis       Next, the retropubic sling procedure was performed  Two areas on the anterior abdominal wall at the level of pubic bone 2 fingerbreadths above and lateral to the midline on the pubic bone, were identified and two skin marks were placed  Local anesthetic solution was delivered at these sites infiltrating the muscle, fascia, and subdermal levels  We then turned our attention to the anterior vaginal wall   The mid urethral level was identified by reference to the Love bulb at the external meatus  Local anesthetic solution was injected into the anterior vaginal wall at the midurethral level for hydrodissection and vasoconstriction  Then, 10 mL were injected at the midline and an additional 7 mL were injected to the left and right of midline directing the infiltration laterally towards the ipsilateral mid-axillary line under the pubic symphysis       After completion of hydrodissection, 2 Allis clamps were used to grasp the anterior vaginal wall for traction, and a 1 3-cm incision was made at the midline incising epithelium and muscularis layers  Two Allis clamps were then placed on each cut edge of the incision for stabilization  Tenotomy scissors were used to create a small vaginal tunnel with sharp and blunt dissection in the anterior vaginal wall directing the dissection lateral to the midline, going towards the underside of the pubic bone  Dissection was carried out to the edge of the pubic bone itself, and we did this bilaterally  Once the dissection was complete, the Love was drained  The rigid stylette was placed through the Love  This will be used for deflection of the bladder, and urethra out of the anticipated path of the trocar  We deviated the bladder to the left side of the patient, and we passed an Advantage sling via the [address] introducer into the pre-dissected tract on the right side of the patient  Once we pierced the pubocervical fascia and passed the bone, we directed our trajectory of the trocar ventral towards the anterior abdominal wall towards the pre-marked skin on the right side the patient maintaining contact with the pubic symphysis  Once we were at the level of the skin, we incised the skin at the misty with a scalpel, and the trocar was passed through that incision  At this time, this was held with ring forceps leaving the trocar in place   We drained the bladder and removed the Love and we performed a cystoscopy by instilling 200 mL of fluid inside the bladder  There was no evidence of any punctures, lacerations, or injuries to the bladder  We reinserted the Love catheter to again drain the bladder  We removed the introducer on the right side and pulled the blue plastic cover of the sling with a Ally clamp until the mesh came out the abdominal incision and secured it at the level of the mesh with the [employer]  We cut the blue plastic cover above the Ally clamp  We repeated the same sequence of steps for the placement of the left side      Once this was done bilaterally, cystoscopy was performed again  Good efflux was noted bilaterally from the ureteral orifices  We retrograde filled the bladder with 300 mL and removed the cystoscope  We performed the cough stress or Crede maneuver and adjusted the sling until we saw minimal to no leakage of fluid  Once the sling was tensioned properly, we removed the plastic sleeve surrounding the sling arms by gently pulling them up through the skin incisions bilaterally with a Ally clamp as counter traction was placed at the midurethral level to avoid any further tightening  The sling arms were trimmed to the level of the skin  The incision in the vaginal mucosa was closed with 2-0 Vicryl suture in a running locked stitch  Good hemostasis was achieved  The skin incisions were closed with Histoacryl  The patient tolerated the procedure well      The Love catheter was reinserted  Vaginal packing was placed in the vagina       The sponge, needle and instrument count were correct x 2  The patient tolerated the procedure well  She was awakened from anesthesia and transferred to the recovery room in stable condition      A qualified resident physician was not available   Dr Heriberto Ibarra was present for the entire procedure      No qualified resident available to assist with procedure      Patient Disposition:  PACU      SIGNATURE: Raegan Amaya MD  DATE: August 8, 2018  TIME: 11:15 AM

## 2018-12-17 NOTE — OP NOTE
[address]   [phone]     Tax ID: [id number]     NPI: [id number]    Op Note        []Hide copied text  Paula Dacosta MD Physician Signed Urogynecology Op Note Date of Service: 2018  9:27 AM   Case ID: [id number] Case Time: 2018  9:27 AM Surgeon: Makayla Shelton MD   Procedure: COLPOPEXY VAGINAL EXTRAPERITONEAL (VEC) Location: [facility]            []Hide copied text  []Hover for attribution information  OPERATIVE REPORT  PATIENT Miriam Chandler  :  1950  ITR: [id number]  Pt Location: [facility] RO[facility] 08     SURGERY DATE: 2018     Surgeon(s) and Role:     * Makayla Shelton MD - Primary     * Ken Thornton MD - Resident, Observe     * Paula Dacosta MD - Fellow, Assist     Preop Diagnosis:  Incomplete uterovaginal prolapse [N81 2]  Cystocele, midline [N81 11]  Incompetence or weakening of pubocervical tissue [N81 82]  Stress incontinence [N39 3]  Hypermobility of urethra [N36 41]     Post-Op Diagnosis Codes:     * Incomplete uterovaginal prolapse [N81 2]     * Cystocele, midline [N81 11]     * Incompetence or weakening of pubocervical tissue [N81 82]     * Stress incontinence [N39 3]     * Hypermobility of urethra [N36 41]     Procedure(s) (LRB):  COLPOPEXY VAGINAL EXTRAPERITONEAL (VEC) (N/A)  ANTERIOR, COLPORRHAPHY (N/A)  INSERTION PUBOVAGINAL SLING (FEMALE) (N/A)  CYSTOSCOPY (N/A)     Specimen(s):  * No specimens in log *     Estimated Blood Loss:   Minimal     Drains:  Urethral Catheter Non-latex 18 Fr  (Active)   Number of days: 0         Anesthesia Type:   Epidural     Operative Indications:  Incomplete uterovaginal prolapse [N81 2]  Cystocele, midline [N81 11]  Incompetence or weakening of pubocervical tissue [N81 82]  Stress incontinence [N39 3]  Hypermobility of urethra [N36 41]     Operative Findings:  1  Stage 3 cystocele and apical prolapse  2  No significant posterior defect  3   No occult bladder or urethral injury on cystoscopy  Robust bilateral efflux from ureteral orifices at completion of the case       Complications:   None     Procedure and Technique:     Appropriate preoperative antibiotics chosen per ACOG guidelines were given   Bilateral SCDs were placed in the lower extremities for DVT prevention prior to the institution of anesthesia      No bladder, ureteral, viscus, or solid organ injury were noted at the end of the procedure      The patient was identified in the holding area by the operating room staff and attending physician  She was taken to the operating room where anesthesia was instituted without complications  She was placed in the dorsal lithotomy position with the legs in [address] [place] [address] with care taken to avoid excessive flexion or extension of her lower extremities  The patient was prepped and draped in the usual sterile fashion  A Love catheter was inserted      The patient was taken to the operating room where she was properly identified  She was given spinal anesthesia and placed in the dorsal lithotomy position in [place] stirrups with care taken to avoid excess flexion or extension at the hips and knees  She was prepped and draped in a normal sterile fashion  A Love catheter was inserted in the bladder  The vesicovaginal space was infiltrated with a dilute Marcaine solution with epinephrine  A full-thickness anterior vaginal wall incision was then made vertically in the midline extending the length of the cystocele  Sharp dissection was then carried out bilaterally to further open the vesicovaginal space  Blunt dissection was then used to break into the paravaginal and paravesical spaces where the ischial spines and sacrospinous ligaments could be identified and cleared off bluntly bilaterally  The prevesical tissue was plicated across the midline with approximately 3 interrupted sutures of 2-0 Vicryl  The Uphold system was then opened   The right arm of the system was articulated with the Capio Device  The head of the Capio was placed in the right paravaginal space and placed over the sacrospinous ligament 2 cm medial to the ischial spine where it was fired capturing the ligament  This procedure was then repeated on the patient's left side  Initial adjustments were made to the arms of the system  The proximal port portion of the body of the mesh in the midline was anchored to the anterior stroma of the cervix at the 12 o'clock position with an interrupted figure-of-eight suture of 0 Ethibond  Final adjustments were then made to the arms of the system, elevating the apex of the vagina and the cervix up to the level of the sacrospinous ligaments  The Prolene suture loop on the patient's right side was cut, and the suture loop and the plastic sheath covering the mesh arm were removed leaving just the right mesh arm in the right sacrospinous ligament  This procedure was then repeated on the patient's left side  The distal portion of the body and the mesh in the midline was anchored just behind the vaginal wall at the level of the bladder neck with an interrupted suture of 2-0 PDS  An additional tacking stitch of 2-0 Vicryl was placed on either side the midline so that the body of the mesh lay flat in the vesicovaginal space  This space was then irrigated with an antibiotic saline solution, and good hemostasis was noted  Excess vaginal wall was trimmed from each edge of the incision  The remaining pubocervical fascia was then plicated across the midline as the full-thickness anterior vaginal wall incision was closed with a running locked suture of 2-0 Vicryl for excellent hemostasis       Next, the retropubic sling procedure was performed  Two areas on the anterior abdominal wall at the level of pubic bone 2 fingerbreadths above and lateral to the midline on the pubic bone, were identified and two skin marks were placed   Local anesthetic solution was delivered at these sites infiltrating the muscle, fascia, and subdermal levels  We then turned our attention to the anterior vaginal wall  The mid urethral level was identified by reference to the Love bulb at the external meatus  Local anesthetic solution was injected into the anterior vaginal wall at the midurethral level for hydrodissection and vasoconstriction  Then, 10 mL were injected at the midline and an additional 7 mL were injected to the left and right of midline directing the infiltration laterally towards the ipsilateral mid-axillary line under the pubic symphysis       After completion of hydrodissection, 2 Allis clamps were used to grasp the anterior vaginal wall for traction, and a 1 3-cm incision was made at the midline incising epithelium and muscularis layers  Two Allis clamps were then placed on each cut edge of the incision for stabilization  Tenotomy scissors were used to create a small vaginal tunnel with sharp and blunt dissection in the anterior vaginal wall directing the dissection lateral to the midline, going towards the underside of the pubic bone  Dissection was carried out to the edge of the pubic bone itself, and we did this bilaterally  Once the dissection was complete, the Love was drained  The rigid stylette was placed through the Love  This will be used for deflection of the bladder, and urethra out of the anticipated path of the trocar  We deviated the bladder to the left side of the patient, and we passed an Advantage sling via the [facility] SYSTEM introducer into the pre-dissected tract on the right side of the patient  Once we pierced the pubocervical fascia and passed the bone, we directed our trajectory of the trocar ventral towards the anterior abdominal wall towards the pre-marked skin on the right side the patient maintaining contact with the pubic symphysis  Once we were at the level of the skin, we incised the skin at the misty with a scalpel, and the trocar was passed through that incision  At this time, this was held with ring forceps leaving the trocar in place  We drained the bladder and removed the Love and we performed a cystoscopy by instilling 200 mL of fluid inside the bladder  There was no evidence of any punctures, lacerations, or injuries to the bladder  We reinserted the Love catheter to again drain the bladder  We removed the introducer on the right side and pulled the blue plastic cover of the sling with a Ally clamp until the mesh came out the abdominal incision and secured it at the level of the mesh with the [employer]  We cut the blue plastic cover above the Ally clamp  We repeated the same sequence of steps for the placement of the left side      Once this was done bilaterally, cystoscopy was performed again  Good efflux was noted bilaterally from the ureteral orifices  We retrograde filled the bladder with 300 mL and removed the cystoscope  We performed the cough stress or Crede maneuver and adjusted the sling until we saw minimal to no leakage of fluid  Once the sling was tensioned properly, we removed the plastic sleeve surrounding the sling arms by gently pulling them up through the skin incisions bilaterally with a Ally clamp as counter traction was placed at the midurethral level to avoid any further tightening  The sling arms were trimmed to the level of the skin  The incision in the vaginal mucosa was closed with 2-0 Vicryl suture in a running locked stitch  Good hemostasis was achieved  The skin incisions were closed with Histoacryl  The patient tolerated the procedure well      The Love catheter was reinserted  Vaginal packing was placed in the vagina       The sponge, needle and instrument count were correct x 2  The patient tolerated the procedure well   She was awakened from anesthesia and transferred to the recovery room in stable condition       Dr Sabi Smith was present for the entire procedure      No qualified resident available to assist with procedure      Patient Disposition:  PACU      Estefania Bryan MD  DATE: August 8, 2018  TIME: 11:15 AM

## (undated) DEVICE — SUT VICRYL 2-0 SH 27 IN UNDYED J417H

## (undated) DEVICE — SUT PDS II 2-0 CT-2 27 IN Z333H

## (undated) DEVICE — BAG URINE DRAINAGE 2000ML ANTI RFLX LF

## (undated) DEVICE — CAUTERY TIP POLISHER: Brand: DEVON

## (undated) DEVICE — DRAPE FLUID WARMER (BIRD BATH)

## (undated) DEVICE — UROCATCH BAG

## (undated) DEVICE — SCD SEQUENTIAL COMPRESSION COMFORT SLEEVE MEDIUM KNEE LENGTH: Brand: KENDALL SCD

## (undated) DEVICE — BUTTON SWITCH PENCIL HOLSTER: Brand: VALLEYLAB

## (undated) DEVICE — STANDARD SURGICAL GOWN, L: Brand: CONVERTORS

## (undated) DEVICE — ALLENTOWN DR  LUCENTE S LAP PK: Brand: CARDINAL HEALTH

## (undated) DEVICE — ADHESIVE SKN CLSR HISTOACRYL FLEX 0.5ML LF

## (undated) DEVICE — PACK TUR

## (undated) DEVICE — MEDI-VAC YANKAUER SUCTION HANDLE W/BULBOUS AND CONTROL VENT: Brand: CARDINAL HEALTH

## (undated) DEVICE — INTENDED FOR TISSUE SEPARATION, AND OTHER PROCEDURES THAT REQUIRE A SHARP SURGICAL BLADE TO PUNCTURE OR CUT.: Brand: BARD-PARKER SAFETY BLADES SIZE 10, STERILE

## (undated) DEVICE — TUBING SUCTION 5MM X 12 FT

## (undated) DEVICE — GLOVE INDICATOR PI UNDERGLOVE SZ 7.5 BLUE

## (undated) DEVICE — NEEDLE SPINAL 22G X 3.5IN  QUINCKE

## (undated) DEVICE — CATH FOLEY 18FR 5ML 2 WAY UNCOATED SILICONE

## (undated) DEVICE — BASIC SINGLE BASIN-LF: Brand: MEDLINE INDUSTRIES, INC.

## (undated) DEVICE — SPONGE STICK WITH PVP-I: Brand: KENDALL

## (undated) DEVICE — GLOVE INDICATOR PI UNDERGLOVE SZ 7 BLUE

## (undated) DEVICE — NEEDLE HYPO 22G X 1-1/2 IN

## (undated) DEVICE — SUT MONOCRYL 4-0 PS-2 27 IN Y426H

## (undated) DEVICE — GLOVE INDICATOR PI UNDERGLOVE SZ 6.5 BLUE

## (undated) DEVICE — SUT PDS II 2-0 SH 27 IN Z317H

## (undated) DEVICE — 3M™ STERI-DRAPE™ UNDER BUTTOCKS DRAPE WITH POUCH 1084: Brand: STERI-DRAPE™

## (undated) DEVICE — PACKING VAGINAL 2 IN

## (undated) DEVICE — INTENDED FOR TISSUE SEPARATION, AND OTHER PROCEDURES THAT REQUIRE A SHARP SURGICAL BLADE TO PUNCTURE OR CUT.: Brand: BARD-PARKER SAFETY BLADES SIZE 15, STERILE

## (undated) DEVICE — 3000CC GUARDIAN II: Brand: GUARDIAN

## (undated) DEVICE — GLOVE SRG LF STRL BGL SKNSNS 6.5 PF